# Patient Record
Sex: MALE | Race: WHITE | NOT HISPANIC OR LATINO | URBAN - METROPOLITAN AREA
[De-identification: names, ages, dates, MRNs, and addresses within clinical notes are randomized per-mention and may not be internally consistent; named-entity substitution may affect disease eponyms.]

---

## 2018-04-12 ENCOUNTER — HOSPITAL ENCOUNTER (OUTPATIENT)
Dept: PHYSICAL MEDICINE AND REHAB | Facility: CLINIC | Age: 73
Discharge: HOME | End: 2018-04-12
Attending: PHYSICIAN ASSISTANT

## 2018-04-12 DIAGNOSIS — M79.18 MYOFASCIAL PAIN: ICD-10-CM

## 2018-04-12 DIAGNOSIS — M47.812 FACET ARTHROPATHY, CERVICAL: ICD-10-CM

## 2018-04-12 DIAGNOSIS — M54.2 CERVICAL SPINE PAIN: ICD-10-CM

## 2018-04-12 RX ORDER — BENAZEPRIL HYDROCHLORIDE 20 MG/1
20 TABLET ORAL DAILY
Status: SHIPPED | COMMUNITY
Start: 2018-04-12

## 2018-04-12 RX ORDER — CYCLOSPORINE 0.5 MG/ML
1 EMULSION OPHTHALMIC DAILY
Status: SHIPPED | COMMUNITY
Start: 2018-04-12

## 2018-04-19 ENCOUNTER — HOSPITAL ENCOUNTER (OUTPATIENT)
Dept: PHYSICAL MEDICINE AND REHAB | Facility: CLINIC | Age: 73
Discharge: HOME | End: 2018-04-19
Attending: PHYSICIAN ASSISTANT

## 2018-04-19 DIAGNOSIS — M12.88 OTHER SPECIFIC ARTHROPATHIES, NOT ELSEWHERE CLASSIFIED, OTHER SPECIFIED SITE: ICD-10-CM

## 2018-04-19 DIAGNOSIS — M47.812 FACET ARTHROPATHY, CERVICAL: ICD-10-CM

## 2018-04-20 ENCOUNTER — RECORDS - HEALTHEAST (OUTPATIENT)
Dept: ADMINISTRATIVE | Facility: OTHER | Age: 73
End: 2018-04-20

## 2018-04-23 ENCOUNTER — OFFICE VISIT - HEALTHEAST (OUTPATIENT)
Dept: PHYSICAL THERAPY | Facility: CLINIC | Age: 73
End: 2018-04-23

## 2018-04-23 DIAGNOSIS — M54.2 NECK PAIN: ICD-10-CM

## 2018-04-23 DIAGNOSIS — R29.898 DECREASED ROM OF NECK: ICD-10-CM

## 2018-04-26 ENCOUNTER — RECORDS - HEALTHEAST (OUTPATIENT)
Dept: RADIOLOGY | Facility: CLINIC | Age: 73
End: 2018-04-26

## 2018-04-30 ENCOUNTER — OFFICE VISIT - HEALTHEAST (OUTPATIENT)
Dept: PHYSICAL THERAPY | Facility: CLINIC | Age: 73
End: 2018-04-30

## 2018-04-30 DIAGNOSIS — R29.898 DECREASED ROM OF NECK: ICD-10-CM

## 2018-04-30 DIAGNOSIS — M54.2 NECK PAIN: ICD-10-CM

## 2018-05-02 ENCOUNTER — OFFICE VISIT - HEALTHEAST (OUTPATIENT)
Dept: PHYSICAL THERAPY | Facility: CLINIC | Age: 73
End: 2018-05-02

## 2018-05-02 DIAGNOSIS — R29.898 DECREASED ROM OF NECK: ICD-10-CM

## 2018-05-02 DIAGNOSIS — M54.2 NECK PAIN: ICD-10-CM

## 2018-05-09 ENCOUNTER — OFFICE VISIT - HEALTHEAST (OUTPATIENT)
Dept: PHYSICAL THERAPY | Facility: CLINIC | Age: 73
End: 2018-05-09

## 2018-05-09 DIAGNOSIS — M54.2 NECK PAIN: ICD-10-CM

## 2018-05-09 DIAGNOSIS — R29.898 DECREASED ROM OF NECK: ICD-10-CM

## 2018-07-24 ENCOUNTER — AMBULATORY - HEALTHEAST (OUTPATIENT)
Dept: ADMINISTRATIVE | Facility: REHABILITATION | Age: 73
End: 2018-07-24

## 2018-07-24 DIAGNOSIS — M54.2 NECK PAIN: ICD-10-CM

## 2018-07-27 ENCOUNTER — AMBULATORY - HEALTHEAST (OUTPATIENT)
Dept: PHYSICAL MEDICINE AND REHAB | Facility: CLINIC | Age: 73
End: 2018-07-27

## 2018-07-27 DIAGNOSIS — M54.2 CERVICAL SPINE PAIN: ICD-10-CM

## 2018-08-09 ENCOUNTER — OFFICE VISIT - HEALTHEAST (OUTPATIENT)
Dept: PHYSICAL THERAPY | Facility: CLINIC | Age: 73
End: 2018-08-09

## 2018-08-09 DIAGNOSIS — M54.2 NECK PAIN: ICD-10-CM

## 2018-08-09 DIAGNOSIS — R29.3 POOR POSTURE: ICD-10-CM

## 2018-08-09 DIAGNOSIS — R29.898 DECREASED ROM OF NECK: ICD-10-CM

## 2018-08-14 ENCOUNTER — OFFICE VISIT - HEALTHEAST (OUTPATIENT)
Dept: PHYSICAL THERAPY | Facility: CLINIC | Age: 73
End: 2018-08-14

## 2018-08-14 DIAGNOSIS — R29.898 DECREASED ROM OF NECK: ICD-10-CM

## 2018-08-14 DIAGNOSIS — M54.2 NECK PAIN: ICD-10-CM

## 2018-08-14 DIAGNOSIS — R29.3 POOR POSTURE: ICD-10-CM

## 2018-08-16 ENCOUNTER — OFFICE VISIT - HEALTHEAST (OUTPATIENT)
Dept: PHYSICAL THERAPY | Facility: CLINIC | Age: 73
End: 2018-08-16

## 2018-08-16 DIAGNOSIS — R29.898 DECREASED ROM OF NECK: ICD-10-CM

## 2018-08-16 DIAGNOSIS — R29.3 POOR POSTURE: ICD-10-CM

## 2018-08-16 DIAGNOSIS — M54.2 NECK PAIN: ICD-10-CM

## 2018-08-20 ENCOUNTER — OFFICE VISIT - HEALTHEAST (OUTPATIENT)
Dept: PHYSICAL THERAPY | Facility: CLINIC | Age: 73
End: 2018-08-20

## 2018-08-20 DIAGNOSIS — M54.2 NECK PAIN: ICD-10-CM

## 2018-08-20 DIAGNOSIS — R29.898 DECREASED ROM OF NECK: ICD-10-CM

## 2018-08-20 DIAGNOSIS — R29.3 POOR POSTURE: ICD-10-CM

## 2018-08-22 ENCOUNTER — OFFICE VISIT - HEALTHEAST (OUTPATIENT)
Dept: PHYSICAL THERAPY | Facility: CLINIC | Age: 73
End: 2018-08-22

## 2018-08-22 DIAGNOSIS — M54.2 NECK PAIN: ICD-10-CM

## 2018-08-22 DIAGNOSIS — R29.3 POOR POSTURE: ICD-10-CM

## 2018-08-22 DIAGNOSIS — R29.898 DECREASED ROM OF NECK: ICD-10-CM

## 2018-08-28 ENCOUNTER — OFFICE VISIT - HEALTHEAST (OUTPATIENT)
Dept: PHYSICAL THERAPY | Facility: CLINIC | Age: 73
End: 2018-08-28

## 2018-08-28 DIAGNOSIS — R29.3 POOR POSTURE: ICD-10-CM

## 2018-08-28 DIAGNOSIS — M54.2 NECK PAIN: ICD-10-CM

## 2018-08-28 DIAGNOSIS — R29.898 DECREASED ROM OF NECK: ICD-10-CM

## 2018-08-31 ENCOUNTER — OFFICE VISIT - HEALTHEAST (OUTPATIENT)
Dept: PHYSICAL THERAPY | Facility: CLINIC | Age: 73
End: 2018-08-31

## 2018-08-31 DIAGNOSIS — M54.2 NECK PAIN: ICD-10-CM

## 2018-08-31 DIAGNOSIS — R29.898 DECREASED ROM OF NECK: ICD-10-CM

## 2018-08-31 DIAGNOSIS — R29.3 POOR POSTURE: ICD-10-CM

## 2018-09-04 ENCOUNTER — OFFICE VISIT - HEALTHEAST (OUTPATIENT)
Dept: PHYSICAL THERAPY | Facility: CLINIC | Age: 73
End: 2018-09-04

## 2018-09-04 DIAGNOSIS — R29.3 POOR POSTURE: ICD-10-CM

## 2018-09-04 DIAGNOSIS — R29.898 DECREASED ROM OF NECK: ICD-10-CM

## 2018-09-04 DIAGNOSIS — M54.2 NECK PAIN: ICD-10-CM

## 2018-09-11 ENCOUNTER — OFFICE VISIT - HEALTHEAST (OUTPATIENT)
Dept: PHYSICAL THERAPY | Facility: CLINIC | Age: 73
End: 2018-09-11

## 2018-09-11 DIAGNOSIS — M54.2 NECK PAIN: ICD-10-CM

## 2018-09-11 DIAGNOSIS — R29.898 DECREASED ROM OF NECK: ICD-10-CM

## 2018-09-11 DIAGNOSIS — R29.3 POOR POSTURE: ICD-10-CM

## 2018-09-18 ENCOUNTER — OFFICE VISIT - HEALTHEAST (OUTPATIENT)
Dept: PHYSICAL THERAPY | Facility: CLINIC | Age: 73
End: 2018-09-18

## 2018-09-18 DIAGNOSIS — M54.2 NECK PAIN: ICD-10-CM

## 2018-09-18 DIAGNOSIS — R29.898 DECREASED ROM OF NECK: ICD-10-CM

## 2018-09-18 DIAGNOSIS — R29.3 POOR POSTURE: ICD-10-CM

## 2018-10-01 ENCOUNTER — OFFICE VISIT - HEALTHEAST (OUTPATIENT)
Dept: PHYSICAL THERAPY | Facility: CLINIC | Age: 73
End: 2018-10-01

## 2018-10-01 DIAGNOSIS — M54.2 NECK PAIN: ICD-10-CM

## 2018-10-01 DIAGNOSIS — R29.898 DECREASED ROM OF NECK: ICD-10-CM

## 2018-10-01 DIAGNOSIS — R29.3 POOR POSTURE: ICD-10-CM

## 2018-10-09 ENCOUNTER — OFFICE VISIT - HEALTHEAST (OUTPATIENT)
Dept: PHYSICAL THERAPY | Facility: CLINIC | Age: 73
End: 2018-10-09

## 2018-10-09 DIAGNOSIS — R29.898 DECREASED ROM OF NECK: ICD-10-CM

## 2018-10-09 DIAGNOSIS — M54.2 NECK PAIN: ICD-10-CM

## 2018-10-09 DIAGNOSIS — R29.3 POOR POSTURE: ICD-10-CM

## 2021-06-01 VITALS — WEIGHT: 156 LBS

## 2021-06-17 NOTE — PROGRESS NOTES
[facility] Daily Progress     Patient Name: Lion Choudhury  Date: 4/30/2018  Visit #: 2  PTA visit #:  -  Referral Diagnosis:   MEDX  Facet arthropathy, cervical [M12.88]  - Primary       Cervical spine pain [M54.2]       Myofascial pain [M79.1]       Referring provider: Heaven Altman PA-C  Visit Diagnosis:     ICD-10-CM    1. Neck pain M54.2    2. Decreased ROM of neck R29.898      Lion Choudhury is a 72 y.o. male who presents to therapy today with chief complaints of acute neck pain which started in January 2018 after flipping while skiing and landing on his head. The patient did have imaging done which was clear of significant pathology. He has felt improvements in pain and no longer has headaches. He also had L C7-T1 facet injection last week. With examination, the patient demonstrates decreased neck ROM, cervical and thoracic joint hypomobility, and weakness on MEDX. He has no red flag signs. The patient is appropriate for skilled PT to improve his ROM, mobility, strength, pain, and overall function.      Assessment:     HEP/POC compliance is  good .     The patient shows good tolerance to MEDX. He does continue to have some hypomobility in the thoracic and cervical spine. The patient is doing well with pain and function and may not need to finish to 4 weeks with MEDX.    Goal Status:  Pt. will be independent with home exercise program in : 4 weeks  Pt. will report decreased intensity, frequency of : Pain;in 4 weeks;Comment  Comment:: 50%  Pt will: return ROM to WNL without increased pain in 6 weeks:  Pt will: improve cervical strength on MEDX to within 1 SD of the norm in 8 weeks:    Plan / Patient Education:     Continue with initial plan of care.    Plan for next visit: Cervical MEDX DE, rotary torso, 4 way neck, cervical and thoracic joint mobilizations. Continue HEP mobility and stretching. I's and T's.    Subjective:     Pain Rating: Less than one     Neck felt fine after last  session, still just a little stiff. Working on the stretching.    Objective:     Patient has completed the 1st week of the MEDX program.    Review of HEP.  Hypomobility with thoracic PA mobilizations.    Exercises:  Exercise #1: CROM X 10 all motions  Comment #1: UT strech X 30 seconds  Exercise #2: Cervical Rotation SNAGS X 5-10  Comment #2: Rotary Torso 90 seconds 26#'s started to the R  Exercise #3: 4 way neck 26#'s SH 3  Comment #3: SB X 15  Exercise #4: Reach and Roll  Comment #4: X 5      Treatment Today     TREATMENT MINUTES COMMENTS   Evaluation     Self-care/ Home management     Manual therapy 5 Thoracic PA mobilizations grade III T2-6   Neuromuscular Re-education     Therapeutic Activity     Therapeutic Exercises 25 MEDX DE, rotary torso, 4 way neck, review of HEP   Gait training     Modality__________________                Total 30    Blank areas are intentional and mean the treatment did not include these items.       Karl TIPTON  4/30/2018
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
## 2021-06-17 NOTE — PROGRESS NOTES
[facility] Daily Progress     Patient Name: Lion Choudhury  Date: 5/2/2018  Visit #: 3  PTA visit #:  -  Referral Diagnosis:   MEDX  Facet arthropathy, cervical [M12.88]  - Primary       Cervical spine pain [M54.2]       Myofascial pain [M79.1]       Referring provider: Heaven Altman PA-C  Visit Diagnosis:     ICD-10-CM    1. Neck pain M54.2    2. Decreased ROM of neck R29.898      Lion Choudhury is a 72 y.o. male who presents to therapy today with chief complaints of acute neck pain which started in January 2018 after flipping while skiing and landing on his head. The patient did have imaging done which was clear of significant pathology. He has felt improvements in pain and no longer has headaches. He also had L C7-T1 facet injection last week. With examination, the patient demonstrates decreased neck ROM, cervical and thoracic joint hypomobility, and weakness on MEDX. He has no red flag signs. The patient is appropriate for skilled PT to improve his ROM, mobility, strength, pain, and overall function.      Assessment:     HEP/POC compliance is  good .     The patient shows good tolerance to MEDX. He does continue to have some hypomobility in the thoracic and cervical spine. The patient is doing well with pain and function and may not need to finish to 4 weeks with MEDX.    Goal Status:  Pt. will be independent with home exercise program in : 4 weeks  Pt. will report decreased intensity, frequency of : Pain;in 4 weeks;Comment  Comment:: 50%  Pt will: return ROM to WNL without increased pain in 6 weeks:  Pt will: improve cervical strength on MEDX to within 1 SD of the norm in 8 weeks:    Plan / Patient Education:     Continue with initial plan of care.    Plan for next visit: Cervical MEDX DE, rotary torso, 4 way neck, cervical and thoracic joint mobilizations. Continue HEP mobility and stretching. I's and T's.  May reassess ROM.    Subjective:     Pain Rating: Less than one     Pt reports  that subjectively his ROM feels like it is returned to normal.    Objective:     Patient is in his 2nd week of MedX.    Hypomobile cervical rotation and thoracic extension.    Manual therapy:  Thoracic PA mobilizations.  Cervical PAs in supine, grade 1 mobilizations at end range cervical rotation.  Mild sx left side C6-7    Exercises:  Exercise #1: CROM X 10 all motions  Comment #1: UT strech X 30 seconds  Exercise #2: Cervical Rotation SNAGS X 5-10  Comment #2: Rotary Torso 90 seconds 26#'s started to the R  Exercise #3: 4 way neck 26#'s SH 3  Comment #3: SB X 15  Exercise #4: Reach and Roll  Comment #4: X 5      Treatment Today     TREATMENT MINUTES COMMENTS   Evaluation     Self-care/ Home management     Manual therapy 5 Thoracic PA mobilizations grade III T2-6   Neuromuscular Re-education     Therapeutic Activity     Therapeutic Exercises 25 MEDX DE, rotary torso, 4 way neck, review of HEP   Gait training     Modality__________________                Total 30    Blank areas are intentional and mean the treatment did not include these items.       Jennifer Garcia, DPT  5/2/2018

## 2021-06-17 NOTE — PROGRESS NOTES
"[facility] Daily Progress / Discharge Summary    Patient Name: Loin Choudhury  Date: 5/9/2018  Visit #: 4  PTA visit #:  -  Referral Diagnosis:   MEDX  Facet arthropathy, cervical [M12.88]  - Primary       Cervical spine pain [M54.2]       Myofascial pain [M79.1]       Referring provider: Heaven Altman PA-C  Visit Diagnosis:     ICD-10-CM    1. Neck pain M54.2    2. Decreased ROM of neck R29.898        Assessment:     Lion has made limited progress with his 2 weeks of PT , 4 visits from 4/34/18-5/9/18, however has tolerated exercise and manual well.  Per pt request he would like to discontinue therapy at this time.  He reports \"this is something I can live with\"    Goal Status:  Pt. will be independent with home exercise program in : 4 weeks  Pt. will report decreased intensity, frequency of : Pain;in 4 weeks;Comment  Comment:: 50%  Pt will: return ROM to WNL without increased pain in 6 weeks:  Pt will: improve cervical strength on MEDX to within 1 SD of the norm in 8 weeks:    Plan / Patient Education:     DC patient with HEP    Subjective:     Pain Rating: Less than one   Pt reports ongoing stiffness in his neck, but also would like to discontinue PT.  He reports it as \"something I can live with\"    Objective:     Patient is in his 2nd week of MedX.    Hypomobile cervical rotation and thoracic extension.    Manual therapy:  Thoracic PA mobilizations.  Cervical PAs in supine, grade 1 mobilizations at end range cervical rotation.  Mild sx left side C6-7    Exercises:  Exercise #1: CROM all motions HEP  Comment #1: UT strech HEP  Exercise #2: Cervical Rotation SNAGS HEP  Comment #2: Rotary Torso 90 seconds 26#'s started to the R  Exercise #3: 4 way neck 26#'s SH 3  Comment #3: SB X 15  Exercise #4: Reach and Roll  Comment #4: HEP    Cervical ROM:    Date: 4/23/18 5/9/18      *Indicate scale AROM AROM AROM   Cervical Flexion 50 deg 45 deg      Cervical Extension 45 deg  40 deg       Right Left " Right Left Right Left   Cervical Sidebending 22 deg 22 deg  20 deg 20 deg       Cervical Rotation 50 deg 40 deg  45 deg 45 deg       Thoracic Rotation                     Treatment Today     TREATMENT MINUTES COMMENTS   Evaluation     Self-care/ Home management     Manual therapy 20 Supine Cervical PAs grade 3  STM cervical paraspinals and upper trapezius area   Neuromuscular Re-education     Therapeutic Activity     Therapeutic Exercises 10 MEDX DE, rotary torso, 4 way neck, review of HEP   Gait training     Modality__________________                Total 30    Blank areas are intentional and mean the treatment did not include these items.       Jennifer Garcia, DPT  5/9/2018

## 2021-06-19 NOTE — PROGRESS NOTES
"[facility] Daily Progress     Patient Name: Lion Choudhury  Date: 2018   Initial Eval Date: 18  Visit #: 4  Referral Diagnosis: neck pain  Referring provider: Heaven Altman PA-C  Visit Diagnosis:     ICD-10-CM    1. Decreased ROM of neck R29.898    2. Poor posture R29.3    3. Neck pain M54.2          Assessment:   Patient is currently in the 2nd week of the MedX program and tolerating well. Pt continued with the manual therapy this visit as the patient finds this beneficial.    HEP/POC compliance is  good .  Patient is appropriate to continue with skilled physical therapy intervention, as indicated by initial plan of care.    Goal Status:  Pt. will be independent with home exercise program in : 4 weeks  Pt will: improve cervical extension by 30# in 8 weeks    Plan / Patient Education:     Continue with initial plan of care.  Progress with home program as tolerated.    PLAN for next visit: progress manual therapy,   Subjective:     Pain Ratin-1, 2-3 with turning    Still feels the stiffness with the turning. A little bit worse going to the right.    Objective:     Patient has completed the 2nd week of MEDX.    Exercises: (see flow sheet for date performed)  Exercise #1: Supine cervical retraction x 10 hold 5 seconds  Comment #1: scapular retraction x 10 hold 5 seconds  Exercise #2: Nustep x 3 minutes  Comment #2: Rotary Torso 90\" Started to the R 30#  Exercise #3: Green band shoulder extension x 15, cues to slow  Comment #3: Cervical rotation SNAGS X 10   Exercise #4: UT Stretch HEP    Cervical MedX Initial testing (18) 4-week Re-test   AROM (full= 0-120  cervical) 15-96    Max Extension Torque  247#    Flex: ext ratio (ideal 1.4:1)         Treatment Today     TREATMENT MINUTES COMMENTS   Evaluation     Self-care/ Home management     Manual therapy 10 Patient positioned in supine- Cervical distraction, MFR layer III to suboccipitals and B UT   Neuromuscular Re-education     Therapeutic " Activity     Therapeutic Exercises 15 See flow sheet   Gait training     Modality__________________                Total 25    Blank areas are intentional and mean the treatment did not include these items.       Karl TIPTON  8/20/2018

## 2021-06-19 NOTE — PROGRESS NOTES
"[facility] Daily Progress     Patient Name: Lion Choudhury  Date: 2018   Initial eval date: 18  Visit #: 2  Referral Diagnosis: neck pain  Referring provider: Heaven Altman PA-C  Visit Diagnosis:     ICD-10-CM    1. Decreased ROM of neck R29.898    2. Poor posture R29.3    3. Neck pain M54.2          Assessment:   Patient is currently in the 1st week of the MedX program and tolerating well. PT added resistance band to scapular strengthening this visit.    HEP/POC compliance is  good .  Patient is appropriate to continue with skilled physical therapy intervention, as indicated by initial plan of care.    Goal Status:  Pt. will be independent with home exercise program in : 4 weeks  Pt will: improve cervical extension by 30# in 8 weeks    Plan / Patient Education:     Continue with initial plan of care.  Progress with home program as tolerated.    PLAN for next visit: progress manual therapy  Subjective:     Pain Ratin-1, 2-3 with turning  Patient is doing well since the last session. No new concerns or questions this visit.      Objective:     Exercises:  Exercise #1: Supine cervical retraction x 10 hold 5 seconds  Comment #1: scapular retraction x 10 hold 5 seconds  Exercise #2: Nustep x 5 minutes  Comment #2: Rotary Torso 90\" Started to the R 30#  Exercise #3: Green band shoulder extension x 15, cues to slow    Cervical MedX Initial testing (18) 4-week Re-test   AROM (full= 0-120  cervical) 15-96    Max Extension Torque  247#    Flex: ext ratio (ideal 1.4:1)         Treatment Today     TREATMENT MINUTES COMMENTS   Evaluation     Self-care/ Home management     Manual therapy 5 Patient positioned in supine- Cervical distraction, MFR layer III to suboccipitals   Neuromuscular Re-education     Therapeutic Activity     Therapeutic Exercises 23 See flow sheet   Gait training     Modality__________________                Total 28    Blank areas are intentional and mean the treatment did not " include these items.       Jennifer Shelby  8/14/2018

## 2021-06-19 NOTE — PROGRESS NOTES
[facility] Certification Request    August 9, 2018      Patient: Lion Choudhury  MR Number: [id number]  YOB: 1945  Date of Visit: 8/9/2018      Dear Heaven:    Thank you for this referral.   We are seeing Lion Choudhury for Physical Therapy of cervicalgia. Patient re-initiation of th Medx program as he stopped 2 weeks after intial visit.    Medicare and/or Medicaid requires physician review and approval of the treatment plan. Please review the plan of care and verify that you agree with the therapy plan of care by co-signing this note.      Plan of Care  Communication with: Referral Source  Patient Related Instruction: Nature of Condition;Basis of treatment;Treatment plan and rationale;Posture;Body mechanics  Times per Week: 2  Number of Weeks: 16  Number of Visits: 16  Therapeutic Exercise: Strengthening;Stretching;ROM  Neuromuscular Reeducation: posture  Manual Therapy: joint mobilization;myofascial release;soft tissue mobilization  Modalities: TENS    Goals:  Pt. will be independent with home exercise program in : 4 weeks  Pt will: improve cervical extension by 30# in 8 weeks      If you have any questions or concerns, please don't hesitate to call.    Sincerely,      Jennifer Shelby, PT        Physician recommendation:     ___ Follow therapist's recommendation        ___ Modify therapy      *Physician co-signature indicates they certify the need for these services furnished within this plan and while under their care.      [facility]   Cervical Thoracic Initial Evaluation    Patient Name: Lion Choudhury  Date of evaluation: 8/9/2018  Referral Diagnosis: Facet arthropathy, cervical  Referring provider: Carlos Cook,*  Visit Diagnosis: No diagnosis found.    Assessment:    Patient is presenting for round 2 of PT following fall on neck in Jan, 2018 while skiing. He stopped PT after 2 weeks the last session but is still have some soreness with the cervical  motions. PT eval revealed decreased cervical ROM measurement and well below average strength measures on cervical MedX testing. Special testing negative. PT established goals in agreement with the patient.    Pt. is appropriate for skilled PT intervention as outlined in the Plan of Care (POC).  Pt. is a good candidate for skilled PT services to improve pain levels and function.    Goals:  Pt. will be independent with home exercise program in : 4 weeks  Pt will: improve cervical extension by 30# in 8 weeks    Patient's expectations/goals are realistic.    Barriers to Learning or Achieving Goals:  No Barriers.       Plan / Patient Instructions:        Plan of Care:   Communication with: Referral Source  Patient Related Instruction: Nature of Condition;Basis of treatment;Treatment plan and rationale;Posture;Body mechanics  Times per Week: 2  Number of Weeks: 16  Number of Visits: 16  Therapeutic Exercise: Strengthening;Stretching;ROM  Neuromuscular Reeducation: posture  Manual Therapy: joint mobilization;myofascial release;soft tissue mobilization  Modalities: TENS    Plan for next visit: rotary torso, add MFR     Subjective:         History of Present Illness:    Lion is a 73 y.o. male who presents to therapy today with complaints of neck pain falling a ski accident in 2018 where he partially landed on his neck in flexion. He previously did PT in April but may have stopped too soon. He is better with the movement but is still noting some stiffness or stretch with head movements.   He is generally active, walks for exercise. He would like to start on the MedX. Decrease soreness with movement.       Pertinent medical history: degenerative changes of the c-spine.    Pain Ratin  Pain rating at best: 1  Soreness with motion especially rotation    Functional limitations are described as occurring with:   turning head    Patient reports benefit from:  movement or exercise          Objective:      Note: Items left  blank indicates the item was not performed or not indicated at the time of the evaluation.    Patient Outcome Measures :    No Data Recorded   Scores range from 0-100%, where a score of 0% represents minimal pain and maximal function. The minmal clinically important difference is a score reduction of 10%.    Cervical Thoracic Examination  No diagnosis found.  Involved side: Bilateral  Posture Observation:      General sitting posture is  fair.  Shoulder/Thoracic complex: B scapular elevation    Cervical ROM:    Date: 8/9/18     *Indicate scale AROM AROM AROM   Cervical Flexion 37     Cervical Extension 38      Right Left Right Left Right Left   Cervical Sidebending 20 2o       Cervical Rotation 42 50       Cervical Protraction      Cervical Retraction      Thoracic Flexion      Thoracic Extension      Thoracic Sidebending         Thoracic Rotation           Strength     Date: 8/9/18     Cervical Myotomes/5 Right Left Right Left Right Left   Cervical Flexion (C1-2) 5 5       Cervical Sidebending (C3) 5 5       Shoulder Elevation (C4) 5 5       Shoulder Abduction (C5) 5 5       Elbow Flexion (C6) 5 5       Elbow Extension (C7) 5 5       Wrist Flexion (C7) 5 5       Wrist Extension (C6) 5 5       Thumb abduction (C8) 5 5       Finger Abduction (T1) 5 5         Sensation   Equal and normal B    Reflex Testing  Cervical Dermatomes Right Left UE Reflexes Right Left   Back of the Head (C2)   Biceps (C5-6)     Supraclavicular Fossa (C3)   Brachioradialis (C5-6)     AC Joint (C4)   Triceps (C7-8)     Lateral Biceps (C5)   Raghav s test - -   Palmar Thumb (C6)   LE Reflexes     Palmar 3rd Finger (C7)   Patellar (L3-4)     Palmar 5th Finger (C8)   Achilles (S1-2)     Ulnar Forearm (T1)   Babinski Response           Palpation: increased tone b UT    Passive Mobility-Joint Integrity: Not tested.    Cervical Special Tests     Cervical Special Tests Right Left UE Nerve Mobility Right Left   Cervical compression   Median nerve      Cervical distraction   Ulnar nerve     Spurling s test - - Radial nerve     Shoulder abduction sign   Thoracic outlet     Deep neck flexor endurance test   Anita     Upper cervical rotation   Adson s     Sharper-Shane   Cervical rotation lateral flexion     Alar ligament test   Other:     Other:   Other:       UE Screen: Not indicated.    Treatment Today     TREATMENT MINUTES COMMENTS   Evaluation 20 cervical   Self-care/ Home management     Manual therapy     Neuromuscular Re-education     Therapeutic Activity     Therapeutic Exercises 30 See flow sheet   Gait training     Modality__________________                Total 50    Blank areas are intentional and mean the treatment did not include these items.         PT Evaluation Code: (Please list factors)  Patient History/Comorbidities: neck pain  Examination: cervical  Clinical Presentation: stable   Clinical Decision Making: low    Patient History/  Comorbidities Examination  (body structures and functions, activity limitations, and/or participation restrictions) Clinical Presentation Clinical Decision Making (Complexity)   No documented Comorbidities or personal factors 1-2 Elements Stable and/or uncomplicated Low   1-2 documented comorbidities or personal factor 3 Elements Evolving clinical presentation with changing characteristics Moderate   3-4 documented comorbidities or personal factors 4 or more Unstable and unpredictable High           Jennifer Shelby  8/9/2018  8:38 AM

## 2021-06-19 NOTE — PROGRESS NOTES
"[facility] Daily Progress     Patient Name: Lion Choudhury  Date: 2018   Initial Eval Date: 18  Visit #: 23  Referral Diagnosis: neck pain  Referring provider: Heaven Altman PA-C  Visit Diagnosis:     ICD-10-CM    1. Decreased ROM of neck R29.898    2. Poor posture R29.3    3. Neck pain M54.2          Assessment:   Patient is currently in the 2ndt week of the MedX program and tolerating well. Pt continued with the manual therapy this visit as the patient finds this beneficial.    HEP/POC compliance is  good .  Patient is appropriate to continue with skilled physical therapy intervention, as indicated by initial plan of care.    Goal Status:  Pt. will be independent with home exercise program in : 4 weeks  Pt will: improve cervical extension by 30# in 8 weeks    Plan / Patient Education:     Continue with initial plan of care.  Progress with home program as tolerated.    PLAN for next visit: progress manual therapy, UT stretch sitting on hand  Subjective:     Pain Ratin-1, 2-3 with turning  Patient is doing well since the last session. No new concerns or questions this visit.      Objective:     Exercises: (see flow sheet for date performed)  Exercise #1: Supine cervical retraction x 10 hold 5 seconds  Comment #1: scapular retraction x 10 hold 5 seconds  Exercise #2: Nustep x 5 minutes  Comment #2: Rotary Torso 90\" Started to the R 30#  Exercise #3: Green band shoulder extension x 15, cues to slow    Cervical MedX Initial testing (18) 4-week Re-test   AROM (full= 0-120  cervical) 15-96    Max Extension Torque  247#    Flex: ext ratio (ideal 1.4:1)         Treatment Today     TREATMENT MINUTES COMMENTS   Evaluation     Self-care/ Home management     Manual therapy 10 Patient positioned in supine- Cervical distraction, MFR layer III to suboccipitals and B UT   Neuromuscular Re-education     Therapeutic Activity     Therapeutic Exercises 13 See flow sheet   Gait training   "   Modality__________________                Total 23 Patient 8 minutes late for treatment   Blank areas are intentional and mean the treatment did not include these items.       Jennifer Shelby  8/16/2018

## 2021-06-20 NOTE — PROGRESS NOTES
"[facility] Daily Progress     Patient Name: Lion Choudhury  Date: 10/9/2018   Initial Eval Date: 18  Visit #: 12  Referral Diagnosis: neck pain  Referring provider: Heaven Altman PA-C  Visit Diagnosis:     ICD-10-CM    1. Decreased ROM of neck R29.898    2. Poor posture R29.3    3. Neck pain M54.2          Assessment:   Patient is currently in the 8th week of the MedX program and tolerating well. Patient continues with decreased mobility of neck and thoracic spine as well as impaired posture, although this is improving with PT.     Patient to begin independent home program but PT will hold chart open for one month in case the patients' needs change.    HEP/POC compliance is  good .  Patient is appropriate to continue with skilled physical therapy intervention, as indicated by initial plan of care.    Goal Status:  Pt. will be independent with home exercise program in : 4 weeks  Pt will: improve cervical extension by 30# in 8 weeks    Plan / Patient Education:     Continue with initial plan of care.  Progress with home program as tolerated.    PLAN for next visit: add head lift to cervical retraction    Subjective:     Pain Ratin-1, 2-3 with turning    He seems like it is to a point where it is not as sore. Some soreness with turning but he is feeling better.    Objective:     Patient is in the 8th week of MEDX.  Hypomobility with some pain to PA mobilizations of mid thoracic spine. T4-6.    Cervical ROM:    Date: 18    *Indicate scale AROM AROM AROM   Cervical Flexion 37 55    Cervical Extension 38 40     Right Left Right Left Right Left   Cervical Sidebending 20 2o       Cervical Rotation 42 50 57 60         Exercises: (see flow sheet for date performed)  Exercise #1: Supine cervical retraction x 10 hold 5 seconds  Comment #1: scapular retraction x 10 hold 5 seconds  Exercise #2: Treadmill x 4 minutes  Comment #2: Rotary Torso 90\" Started to the R 40#  Exercise #3: Green band " shoulder extension x 15, cues to slow  Comment #3: Cervical rotation SNAGS X 10   Exercise #4: UT Stretch HEP  Comment #4: 4way neck SH- 3 1)lsidebending x 15 34#  Exercise #5: seated cervical sidebending stretch x 20 seconds, sitting on hand  Comment #5: Cervical extension isometric x 10 hold 5 seconds  Exercise #6: Deep neck flexor strengthening hold 15 seconds X 3 cues for maintaining chin tuck.    Cervical MedX Initial testing (8/14/18) 4-week Re-test (9/4/18) 8-week Re-test (10/9/18)   AROM (full= 0-120  cervical) 15-96 15-96 15-96   Max Extension Torque  247# 264# 339#   Flex: ext ratio (ideal 1.4:1) 1.54:1 1.43:1 1.37:1       Treatment Today     TREATMENT MINUTES COMMENTS   Evaluation     Self-care/ Home management     Manual therapy 8 Patient positioned in supine- MFR layer III to UT and suboccipitals, cervical distraction   Neuromuscular Re-education     Therapeutic Activity     Therapeutic Exercises 20 See flow sheet   Gait training     Modality__________________                Total 28    Blank areas are intentional and mean the treatment did not include these items.       Jennifer Shelby  10/9/2018

## 2021-06-20 NOTE — PROGRESS NOTES
"[facility] Daily Progress     Patient Name: Lion Choudhury  Date: 2018   Initial Eval Date: 18  Visit #: 8  Referral Diagnosis: neck pain  Referring provider: Heaven Altman PA-C  Visit Diagnosis:     ICD-10-CM    1. Decreased ROM of neck R29.898    2. Poor posture R29.3    3. Neck pain M54.2          Assessment:   Patient is currently in the 4th week of the MedX program and tolerating well. PT performed re-test and patient is demonstrating overall improvement since start of therapy but is still testing below the age matched norms. Patient continues with overall decreased mobility in c-spine and t-spine. PT added thoracic mobilization this visit.    HEP/POC compliance is  good .  Patient is appropriate to continue with skilled physical therapy intervention, as indicated by initial plan of care.    Goal Status:  Pt. will be independent with home exercise program in : 4 weeks  Pt will: improve cervical extension by 30# in 8 weeks    Plan / Patient Education:     Continue with initial plan of care.  Progress with home program as tolerated.    PLAN for next visit: progress manual therapy, HEP as needed. Look at thoracic mobility.Scalene/SCM stretch.    Subjective:     Pain Ratin-1, 2-3 with turning  Patient is feeling good. Still with the stiffness but going father through the motion.     Objective:     Patient is in the 4th week of MEDX.  Scalene/SCM tightness noted with MT today.    Cervical ROM:    Date: 18    *Indicate scale AROM AROM AROM   Cervical Flexion 37 55    Cervical Extension 38 40     Right Left Right Left Right Left   Cervical Sidebending 20 2o       Cervical Rotation 42 50 57 60         Exercises: (see flow sheet for date performed)  Exercise #1: Supine cervical retraction x 10 hold 5 seconds  Comment #1: scapular retraction x 10 hold 5 seconds  Exercise #2: Treadmill x 3 minutes 30 seconds  Comment #2: Rotary Torso 90\" Started to the L 36#  Exercise #3: Green band " shoulder extension x 15, cues to slow  Comment #3: Cervical rotation SNAGS X 10   Exercise #4: UT Stretch HEP  Comment #4: 4way neck SH- 3 1)lsidebending x 15 30#  Exercise #5: SCM stretch x 20 seconds, may want to re-visit next session    Cervical MedX Initial testing (8/14/18) 4-week Re-test (9/4/18)   AROM (full= 0-120  cervical) 15-96 15-96   Max Extension Torque  247# 264#   Flex: ext ratio (ideal 1.4:1) 1.54:1 1.43:1       Treatment Today     TREATMENT MINUTES COMMENTS   Evaluation     Self-care/ Home management     Manual therapy 10 Patient positioned in prone- PA mobilization to thoracic spine  Patient positioned in supine- Cervical distraction, OA mobilization   Neuromuscular Re-education     Therapeutic Activity     Therapeutic Exercises 20 See flow sheet   Gait training     Modality__________________                Total 30    Blank areas are intentional and mean the treatment did not include these items.       Jennifer Shelby  9/4/2018

## 2021-06-20 NOTE — PROGRESS NOTES
"[facility] Daily Progress     Patient Name: Lion Choudhury  Date: 2018   Initial Eval Date: 18  Visit #: 7  Referral Diagnosis: neck pain  Referring provider: Heaven Altman PA-C  Visit Diagnosis:     ICD-10-CM    1. Decreased ROM of neck R29.898    2. Poor posture R29.3    3. Neck pain M54.2          Assessment:   Patient is currently in the 4th week of the MedX program and tolerating well. He will be re-assess in upcoming session for strength improvement. Patient is beginning to show ROM improvements. Scalene/SCM noted tightness today may be contributing.     HEP/POC compliance is  good .  Patient is appropriate to continue with skilled physical therapy intervention, as indicated by initial plan of care.    Goal Status:  Pt. will be independent with home exercise program in : 4 weeks  Pt will: improve cervical extension by 30# in 8 weeks    Plan / Patient Education:     Continue with initial plan of care.  Progress with home program as tolerated.    PLAN for next visit: progress manual therapy, HEP as needed. Look at thoracic mobility. Re-test. Scalene/SCM stretch.    Subjective:     Pain Ratin-1, 2-3 with turning    Still soreness. Not much pain at rest. 4 way neck went fine. Trying to get exercises in daily.    Objective:     Patient is in the 4th week of MEDX.  Scalene/SCM tightness noted with MT today.    Cervical ROM:    Date: 18    *Indicate scale AROM AROM AROM   Cervical Flexion 37 55    Cervical Extension 38 40     Right Left Right Left Right Left   Cervical Sidebending 20 2o       Cervical Rotation 42 50 57 60         Exercises: (see flow sheet for date performed)  Exercise #1: Supine cervical retraction x 10 hold 5 seconds  Comment #1: scapular retraction x 10 hold 5 seconds  Exercise #2: Nustep x 3 minutes  Comment #2: Rotary Torso 90\" Started to the R 34#  Exercise #3: Green band shoulder extension x 15, cues to slow  Comment #3: Cervical rotation SNAGS X 10 "   Exercise #4: UT Stretch HEP  Comment #4: 4way neck SH- 3 1)lsidebending x 15 22#    Cervical MedX Initial testing (8/14/18) 4-week Re-test   AROM (full= 0-120  cervical) 15-96    Max Extension Torque  247#    Flex: ext ratio (ideal 1.4:1) 1.54:1        Treatment Today     TREATMENT MINUTES COMMENTS   Evaluation     Self-care/ Home management     Manual therapy 10 Patient positioned in supine- Cervical distraction, MFR layer III to suboccipitals and B UT, Scalene/SCM MFR lateral II.   Neuromuscular Re-education     Therapeutic Activity     Therapeutic Exercises 17 See flow sheet   Gait training     Modality__________________                Total 27    Blank areas are intentional and mean the treatment did not include these items.       Karl TIPTON  8/31/2018

## 2021-06-20 NOTE — PROGRESS NOTES
"[facility] Daily Progress     Patient Name: Lion Choudhury  Date: 2018   Initial Eval Date: 18  Visit #: 5  Referral Diagnosis: neck pain  Referring provider: Heaven Altman PA-C  Visit Diagnosis:     ICD-10-CM    1. Decreased ROM of neck R29.898    2. Poor posture R29.3    3. Neck pain M54.2          Assessment:   Patient is currently in the 3rd week of the MedX program and tolerating well. Pt continued with the manual therapy this visit as the patient finds this beneficial. Shows improved cervical mobility after joint mobilizations today.    HEP/POC compliance is  good .  Patient is appropriate to continue with skilled physical therapy intervention, as indicated by initial plan of care.    Goal Status:  Pt. will be independent with home exercise program in : 4 weeks  Pt will: improve cervical extension by 30# in 8 weeks    Plan / Patient Education:     Continue with initial plan of care.  Progress with home program as tolerated.    PLAN for next visit: progress manual therapy, HEP as needed. Look at thoracic mobility.    Subjective:     Pain Ratin-1, 2-3 with turning    Neck has been a little sore. He golfed last night and might be from this some.     Objective:     Patient is in the 3rd week of MEDX.    Cervical rotation 45 deg bilaterally   Improved to 55 after joint mobilizations and MT    Exercises: (see flow sheet for date performed)  Exercise #1: Supine cervical retraction x 10 hold 5 seconds  Comment #1: scapular retraction x 10 hold 5 seconds  Exercise #2: Nustep x 3 minutes  Comment #2: Rotary Torso 90\" Started to the L 32#  Exercise #3: Green band shoulder extension x 15, cues to slow  Comment #3: Cervical rotation SNAGS X 10   Exercise #4: UT Stretch HEP    Cervical MedX Initial testing (18) 4-week Re-test   AROM (full= 0-120  cervical) 15-96    Max Extension Torque  247#    Flex: ext ratio (ideal 1.4:1)         Treatment Today     TREATMENT MINUTES COMMENTS   Evaluation   "   Self-care/ Home management     Manual therapy 15 Patient positioned in supine- Cervical distraction, MFR layer III to suboccipitals and B UT, prone Grade II and III C3-6 joint mobilizations PA and unilateral.   Neuromuscular Re-education     Therapeutic Activity     Therapeutic Exercises 15 See flow sheet   Gait training     Modality__________________                Total 25    Blank areas are intentional and mean the treatment did not include these items.       Karl TIPTON  8/22/2018

## 2021-06-20 NOTE — PROGRESS NOTES
"[facility] Daily Progress     Patient Name: Lion Choudhury  Date: 2018   Initial Eval Date: 18  Visit #: 6  Referral Diagnosis: neck pain  Referring provider: Heaven Altman PA-C  Visit Diagnosis:     ICD-10-CM    1. Decreased ROM of neck R29.898    2. Poor posture R29.3    3. Neck pain M54.2          Assessment:   Patient is currently in the 3rd week of the MedX program and tolerating well. PT re-measured cervical ROM this visit and patient is demonstrating improvement overall. He is coming up on re-test next week. PT continues to provide encouragement on improvement in ROM and time required especially given how long ago his injury took place.    HEP/POC compliance is  good .  Patient is appropriate to continue with skilled physical therapy intervention, as indicated by initial plan of care.    Goal Status:  Pt. will be independent with home exercise program in : 4 weeks  Pt will: improve cervical extension by 30# in 8 weeks    Plan / Patient Education:     Continue with initial plan of care.  Progress with home program as tolerated.    PLAN for next visit: progress manual therapy, HEP as needed. Look at thoracic mobility.    Subjective:     Pain Ratin-1, 2-3 with turning    Patient notes to be the same with the soreness. PT educated on the probable improvement in ROM.   Patient seems to be tolerating the MedX and the exercises well.    Objective:     Patient is in the 3rd week of MEDX.    Cervical ROM:    Date: 18    *Indicate scale AROM AROM AROM   Cervical Flexion 37 55    Cervical Extension 38 40     Right Left Right Left Right Left   Cervical Sidebending 20 2o       Cervical Rotation 42 50 57 60         Exercises: (see flow sheet for date performed)  Exercise #1: Supine cervical retraction x 10 hold 5 seconds  Comment #1: scapular retraction x 10 hold 5 seconds  Exercise #2: Nustep x 3 minutes  Comment #2: Rotary Torso 90\" Started to the R 34#  Exercise #3: Green band " shoulder extension x 15, cues to slow  Comment #3: Cervical rotation SNAGS X 10   Exercise #4: UT Stretch HEP  Comment #4: 4way neck SH- 3 1)lsidebending x 15 22#    Cervical MedX Initial testing (8/14/18) 4-week Re-test   AROM (full= 0-120  cervical) 15-96    Max Extension Torque  247#    Flex: ext ratio (ideal 1.4:1) 1.54:1        Treatment Today     TREATMENT MINUTES COMMENTS   Evaluation     Self-care/ Home management     Manual therapy 10 Patient positioned in supine- Cervical distraction, MFR layer III to suboccipitals and B UT, OA mobilization B and unilaterally   Neuromuscular Re-education     Therapeutic Activity     Therapeutic Exercises 20 See flow sheet   Gait training     Modality__________________                Total 30    Blank areas are intentional and mean the treatment did not include these items.       Jennifer Shelby  8/28/2018

## 2021-06-20 NOTE — PROGRESS NOTES
"[facility] Daily Progress     Patient Name: Lion Choudhury  Date: 2018   Initial Eval Date: 18  Visit #: 10  Referral Diagnosis: neck pain  Referring provider: Heaven Altman PA-C  Visit Diagnosis:     ICD-10-CM    1. Decreased ROM of neck R29.898    2. Poor posture R29.3    3. Neck pain M54.2          Assessment:   Patient is currently in the 6th week of the MedX program and tolerating well. Patient continues with decreased mobility of neck and thoracic spine as well as impaired posture, although this is improving with PT. PT added cervical extension isometric into HEP.    HEP/POC compliance is  good .  Patient is appropriate to continue with skilled physical therapy intervention, as indicated by initial plan of care.    Goal Status:  Pt. will be independent with home exercise program in : 4 weeks  Pt will: improve cervical extension by 30# in 8 weeks    Plan / Patient Education:     Continue with initial plan of care.  Progress with home program as tolerated.    PLAN for next visit: add head lift to cervical retraction    Subjective:     Pain Ratin-1, 2-3 with turning  Patient felt good after last session.     Objective:     Patient is in the 6th week of MEDX.  Scalene/SCM tightness noted with MT today.    Cervical ROM:    Date: 18    *Indicate scale AROM AROM AROM   Cervical Flexion 37 55    Cervical Extension 38 40     Right Left Right Left Right Left   Cervical Sidebending 20 2o       Cervical Rotation 42 50 57 60         Exercises: (see flow sheet for date performed)  Exercise #1: Supine cervical retraction x 10 hold 5 seconds  Comment #1: scapular retraction x 10 hold 5 seconds  Exercise #2: Treadmill x 4 minutes  Comment #2: Rotary Torso 90\" Started to the L 40#  Exercise #3: Green band shoulder extension x 15, cues to slow  Comment #3: Cervical rotation SNAGS X 10   Exercise #4: UT Stretch HEP  Comment #4: 4way neck SH- 3 1)lsidebending x 15 34#  Exercise #5: seated " cervical sidebending stretch x 20 seconds, sitting on hand  Comment #5: Cervical extension isometric x 10 hold 5 seconds    Cervical MedX Initial testing (8/14/18) 4-week Re-test (9/4/18)   AROM (full= 0-120  cervical) 15-96 15-96   Max Extension Torque  247# 264#   Flex: ext ratio (ideal 1.4:1) 1.54:1 1.43:1       Treatment Today     TREATMENT MINUTES COMMENTS   Evaluation     Self-care/ Home management     Manual therapy 10 Patient positioned in prone- PA mobilization to thoracic spine  Patient positioned in supine- Cervical distraction, OA mobilization, MFR layer III to B UT, Flexion with rotation mobilization B   Neuromuscular Re-education     Therapeutic Activity     Therapeutic Exercises 20 See flow sheet   Gait training     Modality__________________                Total 30    Blank areas are intentional and mean the treatment did not include these items.       Jennifer Shelby  9/18/2018

## 2021-06-20 NOTE — PROGRESS NOTES
"[facility] Daily Progress     Patient Name: Lion Choudhury  Date: 10/1/2018   Initial Eval Date: 18  Visit #: 11  Referral Diagnosis: neck pain  Referring provider: Heaven Altman PA-C  Visit Diagnosis:     ICD-10-CM    1. Decreased ROM of neck R29.898    2. Poor posture R29.3    3. Neck pain M54.2          Assessment:   Patient is currently in the 7th week of the MedX program and tolerating well. Patient continues with decreased mobility of neck and thoracic spine as well as impaired posture, although this is improving with PT. PT added deep neck flexor strengthening to HEP.    HEP/POC compliance is  good .  Patient is appropriate to continue with skilled physical therapy intervention, as indicated by initial plan of care.    Goal Status:  Pt. will be independent with home exercise program in : 4 weeks  Pt will: improve cervical extension by 30# in 8 weeks    Plan / Patient Education:     Continue with initial plan of care.  Progress with home program as tolerated.    PLAN for next visit: add head lift to cervical retraction    Subjective:     Pain Ratin-1, 2-3 with turning    Doing well with the neck. A little stiff in the upper back after sleeping in a different bed. Glad that he came back to PT.     Objective:     Patient is in the 7th week of MEDX.  Hypomobility with some pain to PA mobilizations of mid thoracic spine. T4-6.    Cervical ROM:    Date: 18    *Indicate scale AROM AROM AROM   Cervical Flexion 37 55    Cervical Extension 38 40     Right Left Right Left Right Left   Cervical Sidebending 20 2o       Cervical Rotation 42 50 57 60         Exercises: (see flow sheet for date performed)  Exercise #1: Supine cervical retraction x 10 hold 5 seconds  Comment #1: scapular retraction x 10 hold 5 seconds  Exercise #2: Treadmill x 4 minutes  Comment #2: Rotary Torso 90\" Started to the R 40#  Exercise #3: Green band shoulder extension x 15, cues to slow  Comment #3: Cervical " rotation SNAGS X 10   Exercise #4: UT Stretch HEP  Comment #4: 4way neck SH- 3 1)lsidebending x 15 34#  Exercise #5: seated cervical sidebending stretch x 20 seconds, sitting on hand  Comment #5: Cervical extension isometric x 10 hold 5 seconds  Exercise #6: Deep neck flexor strengthening hold 15 seconds X 3 cues for maintaining chin tuck.    Cervical MedX Initial testing (8/14/18) 4-week Re-test (9/4/18)   AROM (full= 0-120  cervical) 15-96 15-96   Max Extension Torque  247# 264#   Flex: ext ratio (ideal 1.4:1) 1.54:1 1.43:1       Treatment Today     TREATMENT MINUTES COMMENTS   Evaluation     Self-care/ Home management     Manual therapy 10 Patient positioned in prone- PA mobilization to thoracic spine  Patient positioned in supine- Cervical distraction, OA mobilization, MFR layer III to B UT, Flexion with rotation mobilization B   Neuromuscular Re-education     Therapeutic Activity     Therapeutic Exercises 20 See flow sheet   Gait training     Modality__________________                Total 30    Blank areas are intentional and mean the treatment did not include these items.       Karl TIPTON  10/1/2018

## 2021-06-20 NOTE — PROGRESS NOTES
"[facility] Daily Progress     Patient Name: Lion Choudhury  Date: 2018   Initial Eval Date: 18  Visit #: 9  Referral Diagnosis: neck pain  Referring provider: Heaven Altman PA-C  Visit Diagnosis:     ICD-10-CM    1. Decreased ROM of neck R29.898    2. Poor posture R29.3    3. Neck pain M54.2          Assessment:   Patient is currently in the 5th week of the MedX program and tolerating well. Patient continues with decreased mobility of neck and thoracic spine as well as impaired posture, although this is improving with PT. PT added cervical extension isometric into HEP.    HEP/POC compliance is  good .  Patient is appropriate to continue with skilled physical therapy intervention, as indicated by initial plan of care.    Goal Status:  Pt. will be independent with home exercise program in : 4 weeks  Pt will: improve cervical extension by 30# in 8 weeks    Plan / Patient Education:     Continue with initial plan of care.  Progress with home program as tolerated.    PLAN for next visit: progress manual therapy, HEP as needed. Look at thoracic mobility.Scalene/SCM stretch.    Subjective:     Pain Ratin-1, 2-3 with turning  Patient is feeling the same. He as a massage scheduled later this day. He notes his wife telling him to walk upright while going for a walk.    Objective:     Patient is in the 5th week of MEDX.  Scalene/SCM tightness noted with MT today.    Cervical ROM:    Date: 18    *Indicate scale AROM AROM AROM   Cervical Flexion 37 55    Cervical Extension 38 40     Right Left Right Left Right Left   Cervical Sidebending 20 2o       Cervical Rotation 42 50 57 60         Exercises: (see flow sheet for date performed)  Exercise #1: Supine cervical retraction x 10 hold 5 seconds  Comment #1: scapular retraction x 10 hold 5 seconds  Exercise #2: Treadmill x 3 minutes 30 seconds  Comment #2: Rotary Torso 90\" Started to the R 38#  Exercise #3: Green band shoulder extension x 15, " cues to slow  Comment #3: Cervical rotation SNAGS X 10   Exercise #4: UT Stretch HEP  Comment #4: 4way neck SH- 3 1)lsidebending x 15 32#  Exercise #5: seated cervical sidebending stretch x 20 seconds, sitting on hand  Comment #5: Cervical extension isometric x 10 hold 5 seconds    Cervical MedX Initial testing (8/14/18) 4-week Re-test (9/4/18)   AROM (full= 0-120  cervical) 15-96 15-96   Max Extension Torque  247# 264#   Flex: ext ratio (ideal 1.4:1) 1.54:1 1.43:1       Treatment Today     TREATMENT MINUTES COMMENTS   Evaluation     Self-care/ Home management     Manual therapy 10 Patient positioned in prone- PA mobilization to thoracic spine  Patient positioned in supine- Cervical distraction, OA mobilization   Neuromuscular Re-education     Therapeutic Activity     Therapeutic Exercises 20 See flow sheet   Gait training     Modality__________________                Total 30    Blank areas are intentional and mean the treatment did not include these items.       Jennifer Shelby  9/11/2018

## 2021-06-23 NOTE — PROGRESS NOTES
[facility] Discharge Summary  Patient Name: Lion Choudhury  Date: 1/15/2019  Referral Diagnosis: neck pain  Referring provider: Carlos Cook DO  Visit Diagnosis:   1. Decreased ROM of neck     2. Poor posture     3. Neck pain         Goals:  See below    Patient was seen for 12 visits from 8/9/18 to 10/9/18 with 0 missed appointments.  The patient met goals and has demonstrated understanding of and independence in the home program for self-care, and progression to next steps.  He will initiate contact if questions or concerns arise.    Therapy will be discontinued at this time.  The patient will need a new referral to resume.    Thank you for your referral.  Jennifer Shelby  1/15/2019  12:45 PM